# Patient Record
Sex: MALE | Race: WHITE | URBAN - METROPOLITAN AREA
[De-identification: names, ages, dates, MRNs, and addresses within clinical notes are randomized per-mention and may not be internally consistent; named-entity substitution may affect disease eponyms.]

---

## 2018-01-18 ENCOUNTER — HISTORICAL (OUTPATIENT)
Dept: ADMINISTRATIVE | Facility: HOSPITAL | Age: 63
End: 2018-01-18

## 2022-04-09 ENCOUNTER — HISTORICAL (OUTPATIENT)
Dept: ADMINISTRATIVE | Facility: HOSPITAL | Age: 67
End: 2022-04-09

## 2022-04-27 VITALS
BODY MASS INDEX: 25.33 KG/M2 | WEIGHT: 187 LBS | DIASTOLIC BLOOD PRESSURE: 98 MMHG | OXYGEN SATURATION: 97 % | SYSTOLIC BLOOD PRESSURE: 140 MMHG | HEIGHT: 72 IN

## 2025-06-30 ENCOUNTER — TELEPHONE (OUTPATIENT)
Dept: PRIMARY CARE CLINIC | Facility: CLINIC | Age: 70
End: 2025-06-30
Payer: COMMERCIAL

## 2025-06-30 NOTE — TELEPHONE ENCOUNTER
Spoke with patient to remind him of his upcoming appointment on 7/7/25 at 8:20 am.  Patient was reminded to bring his I.D., insurance and current medications in its original bottles.  Patient confirmed appointment and verbalized understanding.

## 2025-07-07 ENCOUNTER — OFFICE VISIT (OUTPATIENT)
Dept: PRIMARY CARE CLINIC | Facility: CLINIC | Age: 70
End: 2025-07-07
Payer: COMMERCIAL

## 2025-07-07 VITALS
DIASTOLIC BLOOD PRESSURE: 87 MMHG | HEIGHT: 71 IN | BODY MASS INDEX: 25.9 KG/M2 | RESPIRATION RATE: 18 BRPM | SYSTOLIC BLOOD PRESSURE: 149 MMHG | OXYGEN SATURATION: 97 % | WEIGHT: 185 LBS | TEMPERATURE: 98 F | HEART RATE: 68 BPM

## 2025-07-07 DIAGNOSIS — I10 ESSENTIAL HYPERTENSION: ICD-10-CM

## 2025-07-07 DIAGNOSIS — E29.1 HYPOGONADISM IN MALE: Primary | ICD-10-CM

## 2025-07-07 DIAGNOSIS — E11.9 TYPE 2 DIABETES MELLITUS WITHOUT COMPLICATION, WITHOUT LONG-TERM CURRENT USE OF INSULIN: ICD-10-CM

## 2025-07-07 DIAGNOSIS — R19.7 INTERMITTENT DIARRHEA: ICD-10-CM

## 2025-07-07 PROBLEM — M79.10 MYALGIA DUE TO STATIN: Status: ACTIVE | Noted: 2024-12-09

## 2025-07-07 PROBLEM — E78.5 HYPERLIPIDEMIA: Status: ACTIVE | Noted: 2025-07-07

## 2025-07-07 PROBLEM — E55.9 VITAMIN D DEFICIENCY: Status: ACTIVE | Noted: 2021-05-11

## 2025-07-07 PROBLEM — T46.6X5A MYALGIA DUE TO STATIN: Status: ACTIVE | Noted: 2024-12-09

## 2025-07-07 PROCEDURE — 3288F FALL RISK ASSESSMENT DOCD: CPT | Mod: CPTII,,, | Performed by: STUDENT IN AN ORGANIZED HEALTH CARE EDUCATION/TRAINING PROGRAM

## 2025-07-07 PROCEDURE — 99204 OFFICE O/P NEW MOD 45 MIN: CPT | Mod: ,,, | Performed by: STUDENT IN AN ORGANIZED HEALTH CARE EDUCATION/TRAINING PROGRAM

## 2025-07-07 PROCEDURE — 3008F BODY MASS INDEX DOCD: CPT | Mod: CPTII,,, | Performed by: STUDENT IN AN ORGANIZED HEALTH CARE EDUCATION/TRAINING PROGRAM

## 2025-07-07 PROCEDURE — 1101F PT FALLS ASSESS-DOCD LE1/YR: CPT | Mod: CPTII,,, | Performed by: STUDENT IN AN ORGANIZED HEALTH CARE EDUCATION/TRAINING PROGRAM

## 2025-07-07 PROCEDURE — 3077F SYST BP >= 140 MM HG: CPT | Mod: CPTII,,, | Performed by: STUDENT IN AN ORGANIZED HEALTH CARE EDUCATION/TRAINING PROGRAM

## 2025-07-07 PROCEDURE — 1159F MED LIST DOCD IN RCRD: CPT | Mod: CPTII,,, | Performed by: STUDENT IN AN ORGANIZED HEALTH CARE EDUCATION/TRAINING PROGRAM

## 2025-07-07 PROCEDURE — 1160F RVW MEDS BY RX/DR IN RCRD: CPT | Mod: CPTII,,, | Performed by: STUDENT IN AN ORGANIZED HEALTH CARE EDUCATION/TRAINING PROGRAM

## 2025-07-07 PROCEDURE — 3079F DIAST BP 80-89 MM HG: CPT | Mod: CPTII,,, | Performed by: STUDENT IN AN ORGANIZED HEALTH CARE EDUCATION/TRAINING PROGRAM

## 2025-07-07 RX ORDER — MELOXICAM 7.5 MG/1
7.5 TABLET ORAL DAILY PRN
COMMUNITY

## 2025-07-07 RX ORDER — ASPIRIN 81 MG/1
81 TABLET ORAL DAILY
COMMUNITY

## 2025-07-07 RX ORDER — EMPAGLIFLOZIN AND LINAGLIPTIN 25; 5 MG/1; MG/1
1 TABLET, FILM COATED ORAL DAILY
Qty: 90 TABLET | Refills: 3 | Status: SHIPPED | OUTPATIENT
Start: 2025-07-07

## 2025-07-07 RX ORDER — EZETIMIBE 10 MG/1
10 TABLET ORAL EVERY MORNING
COMMUNITY

## 2025-07-07 RX ORDER — AMLODIPINE BESYLATE 10 MG/1
10 TABLET ORAL
COMMUNITY
Start: 2025-04-08

## 2025-07-07 RX ORDER — LOSARTAN POTASSIUM AND HYDROCHLOROTHIAZIDE 25; 100 MG/1; MG/1
1 TABLET ORAL
COMMUNITY
Start: 2025-02-17

## 2025-07-07 RX ORDER — TESTOSTERONE CYPIONATE 200 MG/ML
200 INJECTION, SOLUTION INTRAMUSCULAR
COMMUNITY

## 2025-07-07 RX ORDER — EMPAGLIFLOZIN, LINAGLIPTIN, METFORMIN HYDROCHLORIDE 25; 5; 1000 MG/1; MG/1; MG/1
1 TABLET, EXTENDED RELEASE ORAL EVERY MORNING
COMMUNITY
End: 2025-07-07 | Stop reason: SINTOL

## 2025-07-07 RX ORDER — GLIPIZIDE 2.5 MG/1
2.5 TABLET, EXTENDED RELEASE ORAL
Qty: 90 TABLET | Refills: 3 | Status: SHIPPED | OUTPATIENT
Start: 2025-07-07 | End: 2026-07-07

## 2025-07-07 NOTE — ASSESSMENT & PLAN NOTE
Last A1c was 6.9%    Stopping Trijardy sec to diarrhea (see above)    Start Glyxambi 25-5mg (empagliflozin-linagliptan)  Start glipizide ER 2.5 mg to make up for removing metformin.  Counseled on potential for hypoglycemia.  Recommended switching to more whole wheat/whole grain options when he is eating carbohydrates like Ritz crackers

## 2025-07-07 NOTE — PROGRESS NOTES
Subjective:       Patient ID: Shawn Darby is a 70 y.o. male.    -------------------------------------    Diabetes mellitus, type 2    Hypertension        History of Present Illness    CHIEF COMPLAINT:  Patient presents today for establish care visit with concerns about diabetes medication side effects.    TYPE 2 DIABETES:  He reports A1C of 6.9 in March with personal goal of reaching 6.5. Currently experiencing diarrhea with TriJardy medication, particularly during physical activities like yard work, with symptoms improving after 30-45 minutes of rest. He previously experienced weight loss and significant diarrhea with regular metformin. He desires to regain weight after recent illness.    DIET:  He follows a low-carbohydrate diet including sugar-free Liquid IV, Ritz crackers, one-gram bread for burgers, cauliflower mashed potatoes, and Fairlife chocolate protein shakes (30 g protein). He notes the protein shakes sometimes trigger diarrhea. He denies consuming regular sugar-sweetened beverages and demonstrates awareness of carbohydrate intake.    TESTOSTERONE MANAGEMENT:  He self-administers testosterone injections 1 mL every other week on Mondays. His testosterone levels have been variable over the last year, potentially due to inconsistent injection timing. He is managed by a nurse practitioner and is unaware of his most recent testosterone level. He agrees to obtain labs 6-8 days post-injection for monitoring.    MEDICAL HISTORY:  He has history of severe statin reaction requiring emergency room visit due to muscle pains and cannot tolerate statins. He recently had COVID infection which significantly impacted his health status. He was also diagnosed with prostate infection by urologist which left him feeling unwell.    SOCIAL HISTORY:  He reports significant stress related to his wife's ongoing medical treatment at [facility], where she recently completed a stem cell transplant and is in her final week of a  month-long hospital stay. Despite the challenging circumstances, he states he is coping with the situation.       Review of Systems   Constitutional:  Negative for chills and fever.   HENT:  Negative for sinus pressure/congestion.    Respiratory:  Negative for cough and shortness of breath.    Cardiovascular:  Negative for chest pain.   Gastrointestinal:  Positive for diarrhea. Negative for abdominal pain and nausea.   Genitourinary:  Negative for dysuria.   Musculoskeletal:  Negative for arthralgias.   Integumentary:  Negative for rash.   Neurological:  Negative for headaches.   Psychiatric/Behavioral:  The patient is not nervous/anxious.            Objective:      Physical Exam  Vitals and nursing note reviewed.   Constitutional:       General: He is not in acute distress.  HENT:      Head: Normocephalic.      Nose: No rhinorrhea.   Eyes:      Conjunctiva/sclera: Conjunctivae normal.      Pupils: Pupils are equal, round, and reactive to light.   Cardiovascular:      Rate and Rhythm: Normal rate and regular rhythm.   Pulmonary:      Effort: Pulmonary effort is normal.      Breath sounds: Normal breath sounds.   Abdominal:      Palpations: Abdomen is soft.      Tenderness: There is no abdominal tenderness.   Musculoskeletal:         General: No deformity or signs of injury.   Skin:     General: Skin is warm and dry.   Neurological:      General: No focal deficit present.      Mental Status: He is alert. Mental status is at baseline.   Psychiatric:         Mood and Affect: Mood normal.         Behavior: Behavior normal.           Assessment & Plan:     1. Hypogonadism in male  Assessment & Plan:  Level has fluctuated significantly over past year.    Counseled on importance of having blood work done nursing home between injections to ensure average testosterone is being assessed.  For now, continue testosterone cypionate 200 mg every 14 days    Orders:  -     CBC Auto Differential; Future; Expected date: 09/07/2025  -      Testosterone,Total; Future; Expected date: 09/07/2025    2. Intermittent diarrhea  Assessment & Plan:  Most likely etiology is medication side effect  We'll stop Trijardy, assuming metformin is contributing to diarrhea  See below for DM2 plan    Pt to notify MD if diarrhea persists  Consider lactose intolerance next as he said protein shakes (milk based) provoke diarrhea      3. Type 2 diabetes mellitus without complication, without long-term current use of insulin  Assessment & Plan:  Last A1c was 6.9%    Stopping Trijardy sec to diarrhea (see above)    Start Glyxambi 25-5mg (empagliflozin-linagliptan)  Start glipizide ER 2.5 mg to make up for removing metformin.  Counseled on potential for hypoglycemia.  Recommended switching to more whole wheat/whole grain options when he is eating carbohydrates like Ritz crackers    Orders:  -     empagliflozin-linagliptin (GLYXAMBI) 25-5 mg Tab; Take 1 tablet by mouth once daily.  Dispense: 90 tablet; Refill: 3  -     glipiZIDE (GLUCOTROL) 2.5 MG TR24; Take 1 tablet (2.5 mg total) by mouth daily with breakfast.  Dispense: 90 tablet; Refill: 3  -     Hemoglobin A1C; Future; Expected date: 09/07/2025    4. Essential hypertension  Assessment & Plan:  Patient reports usually elevated in office but normal at home.  He will send home readings via [employer]    Orders:  -     Comprehensive Metabolic Panel; Future; Expected date: 09/07/2025           Follow up in about 3 months (around 10/7/2025) for DM2, Testosterone (Labs). In addition to their scheduled follow up, the patient has also been instructed to follow up on as needed basis.     This note was generated with the assistance of ambient listening technology. Verbal consent was obtained by the patient and accompanying visitor(s) for the recording of patient appointment to facilitate this note. I attest to having reviewed and edited the generated note for accuracy, though some syntax or spelling errors may persist. Please contact the  author of this note for any clarification.

## 2025-07-07 NOTE — ASSESSMENT & PLAN NOTE
Patient reports usually elevated in office but normal at home.  He will send home readings via [employer]

## 2025-07-07 NOTE — ASSESSMENT & PLAN NOTE
Most likely etiology is medication side effect  We'll stop Trijardy, assuming metformin is contributing to diarrhea  See below for DM2 plan    Pt to notify MD if diarrhea persists  Consider lactose intolerance next as he said protein shakes (milk based) provoke diarrhea

## 2025-07-07 NOTE — ASSESSMENT & PLAN NOTE
Level has fluctuated significantly over past year.    Counseled on importance of having blood work done intermediate between injections to ensure average testosterone is being assessed.  For now, continue testosterone cypionate 200 mg every 14 days